# Patient Record
Sex: FEMALE | Race: WHITE | URBAN - METROPOLITAN AREA
[De-identification: names, ages, dates, MRNs, and addresses within clinical notes are randomized per-mention and may not be internally consistent; named-entity substitution may affect disease eponyms.]

---

## 2017-06-08 ENCOUNTER — DOCUMENTATION ONLY (OUTPATIENT)
Dept: ENDOCRINOLOGY | Facility: CLINIC | Age: 67
End: 2017-06-08

## 2017-06-08 LAB
ALT SERPL-CCNC: 40 U/L
CREAT SERPL-MCNC: 0.72 MG/DL
ERYTHROCYTE [DISTWIDTH] IN BLOOD BY AUTOMATED COUNT: NORMAL %
GFR SERPL CREATININE-BSD FRML MDRD: NORMAL ML/MIN/1.73M2
HBA1C MFR BLD: 8.1 % (ref 0–5.7)
HCT VFR BLD AUTO: 39.4 %
HEMOGLOBIN: 12.7 G/DL (ref 11.7–15.7)
MCH RBC QN AUTO: NORMAL PG
MCHC RBC AUTO-ENTMCNC: NORMAL G/DL
MCV RBC AUTO: 93 FL
PLATELET # BLD AUTO: 169 10^9/L
RBC # BLD AUTO: NORMAL 10^12/L
WBC # BLD AUTO: 5.4 10^9/L

## 2017-06-08 NOTE — PROGRESS NOTES
Ms Olguin seen at [facility] for Grade screening visit.    Screening labs -     Nl LFT  A1c = 8.1%  Hgb 12.7  Creat 0.72    Will continue screen/runin per protocol

## 2017-06-09 ENCOUNTER — DOCUMENTATION ONLY (OUTPATIENT)
Dept: RESEARCH | Facility: CLINIC | Age: 67
End: 2017-06-09

## 2017-07-14 LAB
CREAT SERPL-MCNC: 0.89 MG/DL
GFR SERPL CREATININE-BSD FRML MDRD: 67 ML/MIN/1.73M2
HBA1C MFR BLD: 7.8 % (ref 0–5.7)

## 2017-07-20 ENCOUNTER — DOCUMENTATION ONLY (OUTPATIENT)
Dept: ENDOCRINOLOGY | Facility: CLINIC | Age: 67
End: 2017-07-20

## 2017-07-20 DIAGNOSIS — Z00.6 RESEARCH SUBJECT: Primary | ICD-10-CM

## 2017-07-20 NOTE — PROGRESS NOTES
GRADE Randomization visit    Subject is here for GRADE randomization study visit. Patient was randomized to sitagliptin.  We discussed risks of nausea, vomiting, weight loss and associated risk of pancreatitis, pancreatic cancer.  I answered all questions and she is agreeable to start.  Will start 100 mg every morning.   She will continue Metformin XR 2000 mg daily.    A1c at run in = 7.8%    Labs   TG  294  HDL  38  LDL  68  Ur alb/cr = 6.45    Plan:   1.  Start sitagliptin 100 mg every morning.   2.  Continue Metformin 2000 mg daily  3.  Follow up in 3 months, sooner with concerns or side effects.         Wayne Kowalski MD  [facility]  Department of Medicine  Division of Endocrinology and Diabetes

## 2017-08-08 LAB
ALBUMIN URINE MG/G CR: 6.45 MG/G CREATININE
ALBUMIN URINE MG/SPEC: 2
CHOLEST SERPL-MCNC: 165 MG/DL
CREATININE URINE: 31
HDLC SERPL-MCNC: 38 MG/DL
LDLC SERPL CALC-MCNC: 68 MG/DL
NONHDLC SERPL-MCNC: NORMAL MG/DL
TRIGL SERPL-MCNC: 294 MG/DL

## 2017-10-24 ENCOUNTER — DOCUMENTATION ONLY (OUTPATIENT)
Dept: ENDOCRINOLOGY | Facility: CLINIC | Age: 67
End: 2017-10-24

## 2017-10-24 NOTE — PROGRESS NOTES
GRADE study visit     Subject is here for GRADE 3 mo visit. Patient was randomized to sitagliptin and continues on metformin 2000 mg daily.  Doing well, no GI or other side effects with medications.  Has lost couple pounds in wt.         A1c at run in = 7.8%     A1c = 6.7     Plan:   1.  Good response to sitagliptin - continue on, no change.   2.  Continue Metformin 2000 mg daily  3.  Follow up in 3 months, sooner with concerns or side effects.            Wayne Kowalski MD  [facility]  Department of Medicine  Division of Endocrinology and Diabetes

## 2017-10-27 LAB — HBA1C MFR BLD: 6.7 % (ref 0–5.7)

## 2018-01-25 ENCOUNTER — DOCUMENTATION ONLY (OUTPATIENT)
Dept: ENDOCRINOLOGY | Facility: CLINIC | Age: 68
End: 2018-01-25

## 2018-01-25 LAB
HBA1C MFR BLD: 7.1 % (ref 0–5.7)
MICROALBUMIN - QUEST: 22.81

## 2018-04-10 ENCOUNTER — DOCUMENTATION ONLY (OUTPATIENT)
Dept: ENDOCRINOLOGY | Facility: CLINIC | Age: 68
End: 2018-04-10

## 2018-04-10 NOTE — PROGRESS NOTES
GRADE study visit      Subject is here for GRADE 9 mo visit. Patient was randomized to sitagliptin 100 mg daily and continues on metformin 2000 mg daily.  Doing well, no GI or other side effects with medications.    Was in [place] over winter, played some pickle balll while there.  HOpes to continue now that she is back in [place]      Last a1c = 7.1%       A1c this visit = 6.7      Plan:   1.  Diabetes- A1c at goal.  Continue metformin and januvia per protocol.   2.  Follow up in 3 months, sooner with concerns or side effects.

## 2018-04-16 LAB — HBA1C MFR BLD: 6.7 % (ref 0–5.7)

## 2018-07-17 ENCOUNTER — DOCUMENTATION ONLY (OUTPATIENT)
Dept: ENDOCRINOLOGY | Facility: CLINIC | Age: 68
End: 2018-07-17

## 2018-07-17 NOTE — PROGRESS NOTES
GRADE study visit      Subject is here for GRADE 12 mo visit. Patient was randomized to sitagliptin 100 mg daily and continues on metformin 2000 mg daily.  Doing well, no GI or other side effects with medications.    Has noted some incr urination - was treated couple weeks ago for bladder infection.  Has had some BS when checks in 180's but not higher.        Last a1c = 6.7%      Labs today:     A1c  7.1        HDL 39  LDL 79  Creat 0.7  Ur alb/cr  6.82      Plan:   1.  Diabetes- Continue metformin and januvia per protocol.   2.  Follow up in 3 months, sooner with concerns or side effects.

## 2018-07-25 LAB
ALBUMIN URINE MG/G CR: 6.82 MG/G CREATININE
ALBUMIN URINE MG/SPEC: 3
CHOLEST SERPL-MCNC: 160 MG/DL
CREAT SERPL-MCNC: 0.7 MG/DL
CREATININE URINE: 44
GFR SERPL CREATININE-BSD FRML MDRD: 89 ML/MIN/1.73M2
HBA1C MFR BLD: 7.1 % (ref 0–5.7)
HDLC SERPL-MCNC: 39 MG/DL
LDLC SERPL CALC-MCNC: 79 MG/DL
NONHDLC SERPL-MCNC: NORMAL MG/DL
TRIGL SERPL-MCNC: 210 MG/DL

## 2018-10-17 ENCOUNTER — DOCUMENTATION ONLY (OUTPATIENT)
Dept: ENDOCRINOLOGY | Facility: CLINIC | Age: 68
End: 2018-10-17

## 2018-10-17 NOTE — PROGRESS NOTES
GRADE Study Visit    Patient is here for 15 month GRADE study visit. She is doing well. Current study medications are metformin XR 2000 mg daily and Januvia 100 mg daily. Patient is tolerating medications and has been having no side effects. She does not test blood sugars. There has been no recent symptomatic hypoglycemia.     Lab Results:   Component      Latest Ref Rng & Units 10/17/2018   Hemoglobin A1C      0 - 5.6 % 8.2 (A)     Plan:   1. Diabetes. A1c is above target. Will continue current regimen, and encouraged patient to work on diet, lifestyle, and weight loss.   2. Follow up in 3 months, sooner with concerns.     Tomasa Andres MD  [facility]  Department of Medicine  Division of Endocrinology and Diabetes

## 2018-10-26 LAB — HBA1C MFR BLD: 8.2 % (ref 0–5.6)

## 2019-01-16 ENCOUNTER — DOCUMENTATION ONLY (OUTPATIENT)
Dept: ENDOCRINOLOGY | Facility: CLINIC | Age: 69
End: 2019-01-16

## 2019-01-16 NOTE — PROGRESS NOTES
GRADE Study Visit    Patient is here for 18 month GRADE study visit. She is feeling frustrated because blood sugars are high. She started checking more regularly after last visit, when HbA1c was 8.2%. Fasting numbers have typically been 180-200. There has been no symptomatic hypoglycemia. She endorses polydipsia, polyuria, and blurry vision.     Current study medications are metformin XR 2000 mg daily and Januvia 100 mg daily. Weight is stable as compared to last visit.     She has been less active over the winter. She joined the gym this week and plans to use the treadmill and elliptical. She has been trying to watch food intake but her  brings many treats into the house, which has been a challenge.     Lab Results:   Component      Latest Ref Rng & Units 1/16/2019   Hemoglobin A1C      0 - 5.6 % 7.9 (A)   Albumin Urine mg/g Cr      <30 MG/G Creatinine 4.17       Plan:   1. Diabetes. Encouraged patient to work on diet, lifestyle, and weight loss. As HbA1c has now been >7.5% on two consecutive study visits, will recommend that she add Lantus insulin per study protocol.   2. Follow up in 3 months, sooner with concerns.     Tomasa Andres MD  [facility]  Department of Medicine  Division of Endocrinology and Diabetes

## 2019-01-18 LAB
ALBUMIN URINE MG/G CR: 4.17 MG/G CREATININE
ALBUMIN URINE MG/SPEC: NORMAL
CREATININE URINE: NORMAL
HBA1C MFR BLD: 7.9 % (ref 0–5.6)

## 2019-04-17 ENCOUNTER — DOCUMENTATION ONLY (OUTPATIENT)
Dept: ENDOCRINOLOGY | Facility: CLINIC | Age: 69
End: 2019-04-17

## 2019-04-17 LAB — HBA1C MFR BLD: 6.3 % (ref 0–5.6)

## 2019-04-17 NOTE — PROGRESS NOTES
GRADE Study Visit    Patient is here for 21 month GRADE study visit. She is feeling well. Fasting numbers have typically been 100-130. There has been no symptomatic hypoglycemia.     Current study medications are metformin XR 2000 mg daily and Januvia 100 mg daily. Lantus 40 units daily that was started on 1/24/19.      She joined the gym but has not gone very often. She went back to work about 2-3 days per week. She is walking at work but no routine exercise. he has been trying to watch food intake -- increases amount of vegetable.    Lab Results:   Lab Results   Component Value Date    A1C 6.3 04/17/2019    A1C 7.9 01/16/2019    A1C 8.2 10/17/2018    A1C 7.1 07/17/2018    A1C 6.7 04/10/2018       Plan:   1. Diabetes. BGs were good. Continue current regimen. Encouraged patient to work on diet, lifestyle, and weight loss.   2. Follow up in 3 months, sooner with concerns.     Liza Madrigal MD  Division of Diabetes and Endocrinology  Department of Medicine

## 2019-08-07 ENCOUNTER — DOCUMENTATION ONLY (OUTPATIENT)
Dept: ENDOCRINOLOGY | Facility: CLINIC | Age: 69
End: 2019-08-07

## 2019-08-07 LAB
ALBUMIN URINE MG/G CR: 3.85 MG/G CREATININE
ALBUMIN URINE MG/SPEC: NORMAL
CHOLEST SERPL-MCNC: 219 MG/DL (ref 0–200)
CREAT SERPL-MCNC: 0.83 MG/DL (ref 0.4–1.1)
CREATININE URINE: NORMAL
GFR SERPL CREATININE-BSD FRML MDRD: 72 ML/MIN/1.73M2
HBA1C MFR BLD: 6.1 % (ref 0–5.6)
HDLC SERPL-MCNC: 61 MG/DL
LDLC SERPL CALC-MCNC: 121 MG/DL (ref 0–129)
NONHDLC SERPL-MCNC: ABNORMAL MG/DL
TRIGL SERPL-MCNC: 184 MG/DL

## 2019-08-07 NOTE — PROGRESS NOTES
GRADE Study Visit    Patient is here for 24 month GRADE study visit. She is feeling well. She was started on Lantus on 1/24/19. She is currently on Lantus 38 units daily, metformin XR 2000 mg daily and Januvia 100 mg daily. Fasting numbers have typically been 196-100. She did have a few numbers in 66 and 76 which she felt dizzy.     She is retired and spending time in the cabin. She is trying to walk about 1 mile per day. she likes high carb diet particularly chip and popcorn. She is trying to avoid pasta and rice. She ate better when she stayed with her daughter in [place].     Lab Results:   Lab Results   Component Value Date    A1C 6.1 08/07/2019    A1C 6.3 04/17/2019    A1C 7.9 01/16/2019    A1C 8.2 10/17/2018    A1C 7.1 07/17/2018     Results for LEONARDO SANABRIA (MRN [id number]) as of 8/21/2019 11:01   Ref. Range 8/7/2019 00:00   Creatinine Latest Ref Range: 0.4 - 1.1 mg/dL 0.83   GFR Estimate Latest Ref Range: >60 ml/min/1.73m2 72   Hemoglobin A1C Latest Ref Range: 0 - 5.6 % 6.1 (A)   Albumin Urine mg/g Cr Latest Ref Range: <30 MG/G Creatinine 3.85   Cholesterol Latest Ref Range: 0 - 200 mg/dL 219 (A)   HDL Cholesterol Latest Ref Range: >50 mg/dL 61   LDL Cholesterol Calculated Latest Ref Range: 0 - 129 mg/dL 121   Triglycerides Latest Ref Range: <150 mg/dL 184     Plan:   1. Diabetes. BGs were good. Recommend to reduce Lantus to 36 units given tight glucose control. Continue metformin and Januvia. Encouraged patient to work on diet, lifestyle, and weight loss. Healthy diet recipe given today.  2. Follow up in 3 months, sooner with concerns.     Liza Madrigal MD  Division of Diabetes and Endocrinology  Department of Medicine

## 2019-11-06 ENCOUNTER — DOCUMENTATION ONLY (OUTPATIENT)
Dept: ENDOCRINOLOGY | Facility: CLINIC | Age: 69
End: 2019-11-06

## 2019-11-06 LAB — HBA1C MFR BLD: 6.3 % (ref 0–5.6)

## 2019-11-06 NOTE — PROGRESS NOTES
GRADE Study Visit    Patient is here for 27 month [facility] study visit. She is feeling well. She was started on Lantus on 1/24/19. She is currently on Lantus 38 units daily, metformin XR 2000 mg daily and Januvia 100 mg daily. Average fasting glucose in the past 14 days was 102 and in the past 30 days were 115. No hypoglycemia.    She is retired. She will travel to [place] to visit her daughter's family tomorrow.    Lab Results:   Lab Results   Component Value Date    A1C 6.3 11/06/2019    A1C 6.1 08/07/2019    A1C 6.3 04/17/2019    A1C 7.9 01/16/2019    A1C 8.2 10/17/2018       Plan:   1. Diabetes. BGs were good. Continue Lantus 38 units daily. Continue metformin and Januvia. Encouraged patient to work on diet, lifestyle, and weight loss. Discussed Eduarda glucose monitoring for track glucose if insurance covers.  2. Follow up in 3 months, sooner with concerns.     Liza Madrigal MD  Division of Diabetes and Endocrinology  Department of Medicine

## 2020-01-29 ENCOUNTER — DOCUMENTATION ONLY (OUTPATIENT)
Dept: ENDOCRINOLOGY | Facility: CLINIC | Age: 70
End: 2020-01-29

## 2020-01-29 DIAGNOSIS — Z00.6 RESEARCH SUBJECT: Primary | ICD-10-CM

## 2020-01-29 NOTE — PROGRESS NOTES
GRADE Study Visit     Patient is here for 30 month GRADE study visit. She is feeling well. She was started on Lantus on 1/24/19. She is currently on Lantus 38 units daily, metformin XR 2000 mg daily and Januvia 100 mg daily.     Fasting bs typically < 100; had one morning bs of 67; otherwise no issues with hypoglycemia.     Last visit A1c = 6.3%.       Lab Results:       A1c = 6.3%    Ur alb/cr  6.77            Value Date                 Plan:   1. Diabetes. A1c at goal, does not appear to be having issues with hypoglycemia.  Continue current dose of Lantus and other study meds.  2. Follow up in 3 months, sooner with concerns.           Wayne Kowalski MD

## 2020-02-11 LAB
ALBUMIN URINE MG/G CR: 6.77 MG/G CREATININE
ALBUMIN URINE MG/SPEC: 3
CREATININE URINE: 62
HBA1C MFR BLD: 6.3 % (ref 0–5.6)

## 2020-04-22 ENCOUNTER — DOCUMENTATION ONLY (OUTPATIENT)
Dept: ENDOCRINOLOGY | Facility: CLINIC | Age: 70
End: 2020-04-22

## 2020-04-22 NOTE — PROGRESS NOTES
GRADE Study Visit     Patient is here for 33 month GRADE study visit. She is feeling well. She was started on Lantus on 1/24/19. She is currently on Lantus 38-40 units daily, metformin XR 2000 mg daily and Januvia 100 mg daily.     Fasting bs typically ~ 100; the lowest BG was 80; otherwise no issues with hypoglycemia.     Last visit A1c = 6.3%.     A1c today 6.3%    Plan:   1. Diabetes. A1c at goal, Continue current dose of Lantus 38 units daily and other study meds. Continue to be active and healthy diet.  2. Follow up in 3 months, sooner with concerns.       Liza Madrigal MD  Division of Diabetes and Endocrinology  Department of Medicine

## 2020-05-02 LAB — HBA1C MFR BLD: 6.3 % (ref 0–5.6)

## 2020-05-07 LAB — HBA1C MFR BLD: 6.3 % (ref 0–5.6)

## 2020-08-05 ENCOUNTER — DOCUMENTATION ONLY (OUTPATIENT)
Dept: ENDOCRINOLOGY | Facility: CLINIC | Age: 70
End: 2020-08-05

## 2020-08-05 LAB
ALBUMIN URINE MG/G CR: 5.45 MG/G CREATININE
ALBUMIN URINE MG/SPEC: NORMAL
CHOLEST SERPL-MCNC: 131 MG/DL (ref 0–200)
CHOLEST/HDLC SERPL: NORMAL {RATIO}
CREAT SERPL-MCNC: 1.01 MG/DL (ref 0.51–0.95)
CREATININE URINE: NORMAL
GFR SERPL CREATININE-BSD FRML MDRD: 56 ML/MIN/1.73M2
HDLC SERPL-MCNC: 41 MG/DL
LDLC SERPL CALC-MCNC: 55 MG/DL (ref 0–129)
TRIGL SERPL-MCNC: 175 MG/DL

## 2020-08-05 NOTE — PROGRESS NOTES
GRADE Study Visit     Patient has a visit for 36 month [facility] study. She is feeling well. She was started on Lantus on 1/24/19. She is currently on Lantus 38-40 units daily, metformin XR 2000 mg daily and Januvia 100 mg daily.     Fasting bs typically ~ ; the lowest BG was 70s; otherwise no issues with hypoglycemia.     Walking 2-3 times per week about 2 miles per day.    Last visit A1c = 6.3%.     A1c today 6.4 %     Ref. Range 8/5/2020 00:00   Creatinine Latest Ref Range: 0.51 - 0.95 mg/dL 1.01 (A)   GFR Estimate Latest Ref Range: >60 ml/min/1.73m2 56   GFR Estimate If Black Latest Ref Range: >60 ml/min/1.73m2 65   Hemoglobin A1C Latest Ref Range: 0 - 5.6 % 6.4 (A)   Albumin Urine mg/g Cr Latest Ref Range: <30 MG/G Creatinine 5.45   Cholesterol Latest Ref Range: 0 - 200 mg/dL 131   HDL Cholesterol Latest Ref Range: >50 mg/dL 41   LDL Cholesterol Direct Latest Ref Range: 0 - 129 mg/dL 55   Triglycerides Latest Ref Range: <150 mg/dL 175     Plan:   1. Diabetes. A1c at goal, Continue current dose of Lantus 38-40 units daily and other study meds. Continue to be active and healthy diet.  2. Follow up in 3 months, sooner with concerns.       Liza Madrigal MD  Division of Diabetes and Endocrinology  Department of Medicine

## 2020-08-06 LAB — HBA1C MFR BLD: 6.4 % (ref 0–5.6)

## 2020-11-04 NOTE — PROGRESS NOTES
GRADE Study Visit     Patient has a visit for 39 month [facility] study. She is feeling well. She was started on Lantus on 1/24/19. She is currently on Lantus 38-40 units daily, metformin XR 2000 mg daily and Januvia 100 mg daily.     Fasting bs typically ~ ; the lowest BG was 70s; otherwise no issues with hypoglycemia.     Walking 2-3 times per week about 2 miles per day.    Last visit A1c = 6.4%.     A1c today  6.4%    Plan:   1. Diabetes. A1c at goal, Continue current dose of Lantus 38-40 units daily and other study meds. Continue to be active and healthy diet.  2. Follow up in 3 months, sooner with concerns.       Liza Madrigal MD  Division of Diabetes and Endocrinology  Department of Medicine

## 2020-11-05 LAB — HBA1C MFR BLD: 6.4 % (ref 0–5.6)

## 2020-11-16 ENCOUNTER — DOCUMENTATION ONLY (OUTPATIENT)
Dept: ENDOCRINOLOGY | Facility: CLINIC | Age: 70
End: 2020-11-16

## 2021-01-27 ENCOUNTER — DOCUMENTATION ONLY (OUTPATIENT)
Dept: ENDOCRINOLOGY | Facility: CLINIC | Age: 71
End: 2021-01-27

## 2021-01-27 NOTE — PROGRESS NOTES
[facility] Study Final Visit     Patient has a visit for 42 month [facility] study final visit. She is feeling well. She was started on Lantus on 1/24/19. She is currently on Lantus 38 units daily, metformin XR 2000 mg daily and Januvia 100 mg daily without side effects.    Fasting bs typically ~ 130, no issues with hypoglycemia.     She has not watcher her diet for a few weeks as she is doing kitchen remodeling. Has not regularly exercised in the past few weeks either.    Last visit A1c = 6.4%.     A1c today 6.5 %    Plan:   1. Diabetes. A1c at goal, Continue current dose of Lantus 38 units daily and other study meds. Continue to be active and healthy diet. Discussed alternative options.  2. Follow up with PCP      Liza Madrigal MD  Division of Diabetes and Endocrinology  Department of Medicine

## 2021-01-28 LAB
ALBUMIN URINE MG/G CR: 7.59 MG/G CREATININE
ALBUMIN URINE MG/SPEC: NORMAL
CREATININE URINE: NORMAL
HBA1C MFR BLD: 6.5 % (ref 0–5.6)

## 2023-10-11 NOTE — PROGRESS NOTES
GRADE study visit     Subject is here for GRADE 6 mo visit. Patient was randomized to sitagliptin and continues on metformin 2000 mg daily.  Doing well, no GI or other side effects with medications.  Has lost couple pounds in wt.  She is testing her blood sugars occasionally with readings in the 190's.  She thinks she is highest in the morning.  She has also been sick.       A1c: 7.1%  Albumin:creatinine ratio: 22.81     Plan:   1.  Diabetes- Overall, doing ok, but recent blood sugars have been high.  Will check a1c.  Encouraged more exercise, fruits, veggies and whole grains. .   2.  Continue Metformin 2000 mg daily  3.  Follow up in 3 months, sooner with concerns or side effects.      Rajani Blancas PA-C  [facility]  Department of Medicine  Division of Endocrinology and Diabetes   Tazorac Counseling:  Patient advised that medication is irritating and drying.  Patient may need to apply sparingly and wash off after an hour before eventually leaving it on overnight.  The patient verbalized understanding of the proper use and possible adverse effects of tazorac.  All of the patient's questions and concerns were addressed.